# Patient Record
Sex: FEMALE
[De-identification: names, ages, dates, MRNs, and addresses within clinical notes are randomized per-mention and may not be internally consistent; named-entity substitution may affect disease eponyms.]

---

## 2017-11-27 ENCOUNTER — APPOINTMENT (OUTPATIENT)
Dept: ANTEPARTUM | Facility: CLINIC | Age: 27
End: 2017-11-27
Payer: COMMERCIAL

## 2017-11-27 ENCOUNTER — ASOB RESULT (OUTPATIENT)
Age: 27
End: 2017-11-27

## 2017-11-27 PROBLEM — Z00.00 ENCOUNTER FOR PREVENTIVE HEALTH EXAMINATION: Status: ACTIVE | Noted: 2017-11-27

## 2017-11-27 PROCEDURE — 76801 OB US < 14 WKS SINGLE FETUS: CPT

## 2018-01-29 ENCOUNTER — APPOINTMENT (OUTPATIENT)
Dept: ANTEPARTUM | Facility: CLINIC | Age: 28
End: 2018-01-29
Payer: COMMERCIAL

## 2018-01-29 ENCOUNTER — ASOB RESULT (OUTPATIENT)
Age: 28
End: 2018-01-29

## 2018-01-29 PROCEDURE — 76817 TRANSVAGINAL US OBSTETRIC: CPT

## 2018-01-29 PROCEDURE — 76811 OB US DETAILED SNGL FETUS: CPT

## 2018-03-19 ENCOUNTER — ASOB RESULT (OUTPATIENT)
Age: 28
End: 2018-03-19

## 2018-03-19 ENCOUNTER — APPOINTMENT (OUTPATIENT)
Dept: ANTEPARTUM | Facility: CLINIC | Age: 28
End: 2018-03-19
Payer: COMMERCIAL

## 2018-03-19 PROCEDURE — 76816 OB US FOLLOW-UP PER FETUS: CPT

## 2018-04-23 ENCOUNTER — APPOINTMENT (OUTPATIENT)
Dept: MATERNAL FETAL MEDICINE | Facility: CLINIC | Age: 28
End: 2018-04-23
Payer: COMMERCIAL

## 2018-04-23 ENCOUNTER — ASOB RESULT (OUTPATIENT)
Age: 28
End: 2018-04-23

## 2018-04-23 ENCOUNTER — OTHER (OUTPATIENT)
Age: 28
End: 2018-04-23

## 2018-04-23 VITALS — WEIGHT: 181 LBS | BODY MASS INDEX: 35.53 KG/M2 | HEIGHT: 60 IN

## 2018-04-23 DIAGNOSIS — Z78.9 OTHER SPECIFIED HEALTH STATUS: ICD-10-CM

## 2018-04-23 DIAGNOSIS — Z83.3 FAMILY HISTORY OF DIABETES MELLITUS: ICD-10-CM

## 2018-04-23 PROCEDURE — G0108 DIAB MANAGE TRN  PER INDIV: CPT

## 2018-05-01 ENCOUNTER — APPOINTMENT (OUTPATIENT)
Dept: ANTEPARTUM | Facility: CLINIC | Age: 28
End: 2018-05-01

## 2018-05-01 ENCOUNTER — ASOB RESULT (OUTPATIENT)
Age: 28
End: 2018-05-01

## 2018-05-01 ENCOUNTER — APPOINTMENT (OUTPATIENT)
Dept: MATERNAL FETAL MEDICINE | Facility: CLINIC | Age: 28
End: 2018-05-01
Payer: COMMERCIAL

## 2018-05-01 ENCOUNTER — APPOINTMENT (OUTPATIENT)
Dept: ANTEPARTUM | Facility: CLINIC | Age: 28
End: 2018-05-01
Payer: COMMERCIAL

## 2018-05-01 VITALS
DIASTOLIC BLOOD PRESSURE: 70 MMHG | SYSTOLIC BLOOD PRESSURE: 108 MMHG | BODY MASS INDEX: 34.96 KG/M2 | HEIGHT: 60 IN | WEIGHT: 178.1 LBS

## 2018-05-01 DIAGNOSIS — O99.810 ABNORMAL GLUCOSE COMPLICATING PREGNANCY: ICD-10-CM

## 2018-05-01 DIAGNOSIS — O24.410 GESTATIONAL DIABETES MELLITUS IN PREGNANCY, DIET CONTROLLED: ICD-10-CM

## 2018-05-01 PROCEDURE — 76816 OB US FOLLOW-UP PER FETUS: CPT

## 2018-05-01 PROCEDURE — 99213 OFFICE O/P EST LOW 20 MIN: CPT

## 2018-05-01 PROCEDURE — 76818 FETAL BIOPHYS PROFILE W/NST: CPT

## 2018-05-01 RX ORDER — VITAMIN C, CALCIUM, IRON, VITAMIN D3, VITAMIN E, VITAMIN B1, VITAMIN B2, VITAMIN B3, VITAMIN B6, FOLIC ACID, IODINE, ZINC, COPPER, DOCUSATE SODIUM, DOCOSAHEXAENOIC ACID (DHA) 27-1-50 MG
KIT ORAL
Refills: 0 | Status: ACTIVE | COMMUNITY

## 2018-05-14 ENCOUNTER — APPOINTMENT (OUTPATIENT)
Dept: MATERNAL FETAL MEDICINE | Facility: CLINIC | Age: 28
End: 2018-05-14
Payer: COMMERCIAL

## 2018-05-14 ENCOUNTER — ASOB RESULT (OUTPATIENT)
Age: 28
End: 2018-05-14

## 2018-05-14 VITALS — WEIGHT: 184.19 LBS | BODY MASS INDEX: 36.16 KG/M2 | HEIGHT: 60 IN

## 2018-05-14 PROCEDURE — G0108 DIAB MANAGE TRN  PER INDIV: CPT

## 2018-05-29 ENCOUNTER — APPOINTMENT (OUTPATIENT)
Dept: MATERNAL FETAL MEDICINE | Facility: CLINIC | Age: 28
End: 2018-05-29

## 2018-05-29 ENCOUNTER — APPOINTMENT (OUTPATIENT)
Dept: ANTEPARTUM | Facility: CLINIC | Age: 28
End: 2018-05-29
Payer: COMMERCIAL

## 2018-05-29 ENCOUNTER — ASOB RESULT (OUTPATIENT)
Age: 28
End: 2018-05-29

## 2018-05-29 PROCEDURE — 76816 OB US FOLLOW-UP PER FETUS: CPT

## 2018-06-05 ENCOUNTER — ASOB RESULT (OUTPATIENT)
Age: 28
End: 2018-06-05

## 2018-06-05 ENCOUNTER — APPOINTMENT (OUTPATIENT)
Dept: ANTEPARTUM | Facility: CLINIC | Age: 28
End: 2018-06-05
Payer: COMMERCIAL

## 2018-06-05 ENCOUNTER — APPOINTMENT (OUTPATIENT)
Dept: ANTEPARTUM | Facility: CLINIC | Age: 28
End: 2018-06-05

## 2018-06-05 PROCEDURE — 76818 FETAL BIOPHYS PROFILE W/NST: CPT

## 2018-06-05 PROCEDURE — 76815 OB US LIMITED FETUS(S): CPT

## 2018-06-11 ENCOUNTER — ASOB RESULT (OUTPATIENT)
Age: 28
End: 2018-06-11

## 2018-06-11 ENCOUNTER — APPOINTMENT (OUTPATIENT)
Dept: ANTEPARTUM | Facility: CLINIC | Age: 28
End: 2018-06-11
Payer: MEDICAID

## 2018-06-11 ENCOUNTER — APPOINTMENT (OUTPATIENT)
Dept: MATERNAL FETAL MEDICINE | Facility: CLINIC | Age: 28
End: 2018-06-11

## 2018-06-11 VITALS — WEIGHT: 188.44 LBS | HEIGHT: 60 IN | BODY MASS INDEX: 36.99 KG/M2

## 2018-06-11 PROCEDURE — 76818 FETAL BIOPHYS PROFILE W/NST: CPT

## 2020-09-30 ENCOUNTER — LABORATORY RESULT (OUTPATIENT)
Age: 30
End: 2020-09-30

## 2020-09-30 ENCOUNTER — APPOINTMENT (OUTPATIENT)
Dept: RHEUMATOLOGY | Facility: CLINIC | Age: 30
End: 2020-09-30
Payer: MEDICAID

## 2020-09-30 VITALS
HEIGHT: 60 IN | SYSTOLIC BLOOD PRESSURE: 126 MMHG | DIASTOLIC BLOOD PRESSURE: 84 MMHG | TEMPERATURE: 99.7 F | WEIGHT: 180 LBS | HEART RATE: 83 BPM | BODY MASS INDEX: 35.34 KG/M2 | OXYGEN SATURATION: 95 %

## 2020-09-30 DIAGNOSIS — R76.8 OTHER SPECIFIED ABNORMAL IMMUNOLOGICAL FINDINGS IN SERUM: ICD-10-CM

## 2020-09-30 PROCEDURE — 99204 OFFICE O/P NEW MOD 45 MIN: CPT

## 2020-09-30 RX ORDER — ASCORBIC ACID 500 MG
TABLET ORAL
Refills: 0 | Status: ACTIVE | COMMUNITY

## 2020-09-30 RX ORDER — BLOOD SUGAR DIAGNOSTIC
STRIP MISCELLANEOUS
Qty: 2 | Refills: 3 | Status: DISCONTINUED | COMMUNITY
Start: 2018-04-23 | End: 2020-09-30

## 2020-09-30 RX ORDER — URINE ACETONE TEST STRIPS
STRIP MISCELLANEOUS
Qty: 1 | Refills: 0 | Status: DISCONTINUED | COMMUNITY
Start: 2018-04-23 | End: 2020-09-30

## 2020-09-30 RX ORDER — LANCETS 28 GAUGE
EACH MISCELLANEOUS
Qty: 2 | Refills: 3 | Status: DISCONTINUED | COMMUNITY
Start: 2018-04-23 | End: 2020-09-30

## 2020-09-30 RX ORDER — CHROMIUM 200 MCG
TABLET ORAL
Refills: 0 | Status: ACTIVE | COMMUNITY

## 2020-09-30 RX ORDER — IRON/IRON ASP GLY/FA/MV-MIN 38 125-25-1MG
TABLET ORAL
Refills: 0 | Status: ACTIVE | COMMUNITY

## 2020-09-30 NOTE — PHYSICAL EXAM
[General Appearance - Well Nourished] : well nourished [General Appearance - Well Developed] : well developed [Sclera] : the sclera and conjunctiva were normal [Hearing Threshold Finger Rub Not Placer] : hearing was normal [Neck Cervical Mass (___cm)] : no neck mass was observed [Auscultation Breath Sounds / Voice Sounds] : lungs were clear to auscultation bilaterally [Heart Sounds] : normal S1 and S2 [Nail Clubbing] : no clubbing  or cyanosis of the fingernails [Musculoskeletal - Swelling] : no joint swelling seen [Motor Tone] : muscle strength and tone were normal [] : no rash [Skin Lesions] : no skin lesions [Affect] : the affect was normal [Mood] : the mood was normal [FreeTextEntry1] : mild alopecia, no malar

## 2020-09-30 NOTE — DATA REVIEWED
[FreeTextEntry1] : TSH normal \par CBC with Hg 9s and ferritin only 7 ( heavy menses) \par cmp with ALT 30 \par JOSE CRUZ 1:80 nuclear \par dsDNA 7 ( indeterminate) \par \par

## 2020-09-30 NOTE — HISTORY OF PRESENT ILLNESS
[FreeTextEntry1] : 28 yo woman with history of presents for eval of positive JOSE CRUZ \par \par Patient is followed by derm for alopecia and had intralesional kenalog and on DHEA.  upon w/u she is noted to have iron deficiency anemia, and Positive JOSE CRUZ 1:80 Nuclear and dsDNA 7 ( mildly positive) .  \par \par Patient complains of photosensitivity, and hand pain in the morning which improved with activity.  she also has pain of the feet and legs and which also improves as she becomes more active.  she is sometimes unable to squeeze mop due to hand pain.  patient denies any malar, oral lesions, eye issues, cough/sob, CP, serositis, low plts, low wbc, GI Issues.  Patient denies f/c/, wt loss, loss of appetite.  Patient denies Raynaud.  patient denies any DVTs/PEs.  Patient had 3 pregnancies, no miscarriages, all babies full term, no complications in pregnancy.  denies dry eye or dry mouth

## 2020-09-30 NOTE — ASSESSMENT
[FreeTextEntry1] : 28 yo woman with history of alopecia, photosensitivity, oral lesion, significant morning arthralgias and positive JOSE CRUZ 1:80 Nuclear and indetermine dsDNA. patient with most likely lupus \par \par --will check serologies and Urine studies \par --will check G6PD and pending results will start HCQ \par --patient is to see eye doctor

## 2020-09-30 NOTE — REVIEW OF SYSTEMS
[As Noted in HPI] : as noted in HPI [Fever] : no fever [Chills] : no chills [Eye Pain] : no eye pain [Red Eyes] : eyes not red [Chest Pain] : no chest pain [Cough] : no cough [SOB on Exertion] : no shortness of breath during exertion [Diarrhea] : no diarrhea [Swollen Glands] : no swollen glands [de-identified] : alopecia

## 2020-10-07 LAB
ALBUMIN SERPL ELPH-MCNC: 5.1 G/DL
ALP BLD-CCNC: 114 U/L
ALT SERPL-CCNC: 26 U/L
ANA SER IF-ACNC: NEGATIVE
ANION GAP SERPL CALC-SCNC: 15 MMOL/L
APPEARANCE: CLEAR
APTT IMM NP/PRE NP PPP: NORMAL
APTT INV RATIO PPP: 35.1 SEC
AST SERPL-CCNC: 19 U/L
B2 GLYCOPROT1 IGA SERPL IA-ACNC: <5 SAU
B2 GLYCOPROT1 IGG SER-ACNC: <5 SGU
B2 GLYCOPROT1 IGM SER-ACNC: <5 SMU
BASOPHILS # BLD AUTO: 0.05 K/UL
BASOPHILS NFR BLD AUTO: 0.7 %
BILIRUB SERPL-MCNC: 0.6 MG/DL
BILIRUBIN URINE: NEGATIVE
BLOOD URINE: NEGATIVE
BUN SERPL-MCNC: 10 MG/DL
C3 SERPL-MCNC: 150 MG/DL
C4 SERPL-MCNC: 21 MG/DL
CALCIUM SERPL-MCNC: 9.5 MG/DL
CARDIOLIPIN AB SER IA-ACNC: NEGATIVE
CCP AB SER IA-ACNC: <8 UNITS
CHLORIDE SERPL-SCNC: 101 MMOL/L
CK SERPL-CCNC: 146 U/L
CO2 SERPL-SCNC: 23 MMOL/L
COLOR: NORMAL
CONFIRM: 28.8 SEC
CREAT SERPL-MCNC: 0.52 MG/DL
CREAT SPEC-SCNC: 58 MG/DL
CREAT/PROT UR: NORMAL RATIO
CRP SERPL-MCNC: 0.41 MG/DL
DRVVT IMM 1:2 NP PPP: NORMAL
DRVVT SCREEN TO CONFIRM RATIO: 0.94 RATIO
DSDNA AB SER-ACNC: <12 IU/ML
ENA RNP AB SER IA-ACNC: 0.4 AL
ENA SM AB SER IA-ACNC: <0.2 AL
ENA SS-A AB SER IA-ACNC: 2.7 AL
ENA SS-B AB SER IA-ACNC: <0.2 AL
EOSINOPHIL # BLD AUTO: 0.2 K/UL
EOSINOPHIL NFR BLD AUTO: 3 %
ERYTHROCYTE [SEDIMENTATION RATE] IN BLOOD BY WESTERGREN METHOD: 15 MM/HR
G6PD SER-CCNC: 17.4 U/G HGB
GLUCOSE QUALITATIVE U: NEGATIVE
GLUCOSE SERPL-MCNC: 98 MG/DL
HCT VFR BLD CALC: 40.3 %
HGB BLD-MCNC: 12.8 G/DL
IMM GRANULOCYTES NFR BLD AUTO: 0.1 %
KETONES URINE: NEGATIVE
LEUKOCYTE ESTERASE URINE: NEGATIVE
LUPUS ANTICOAGULANT CASCADE REFLEX: NORMAL
LYMPHOCYTES # BLD AUTO: 2.19 K/UL
LYMPHOCYTES NFR BLD AUTO: 32.5 %
MAN DIFF?: NORMAL
MCHC RBC-ENTMCNC: 27.9 PG
MCHC RBC-ENTMCNC: 31.8 GM/DL
MCV RBC AUTO: 87.8 FL
MONOCYTES # BLD AUTO: 0.3 K/UL
MONOCYTES NFR BLD AUTO: 4.5 %
NEUTROPHILS # BLD AUTO: 3.99 K/UL
NEUTROPHILS NFR BLD AUTO: 59.2 %
NITRITE URINE: NEGATIVE
NPP NORMAL POOLED PLASMA: NORMAL SECS
PH URINE: 6
PLATELET # BLD AUTO: 313 K/UL
POTASSIUM SERPL-SCNC: 4 MMOL/L
PROT SERPL-MCNC: 7.5 G/DL
PROT UR-MCNC: <4 MG/DL
PROTEIN URINE: NEGATIVE
RBC # BLD: 4.59 M/UL
RBC # FLD: 17.6 %
RF+CCP IGG SER-IMP: NEGATIVE
RHEUMATOID FACT SER QL: <10 IU/ML
SCREEN DRVVT: 30 SEC
SODIUM SERPL-SCNC: 139 MMOL/L
SPECIFIC GRAVITY URINE: 1.01
TSH SERPL-ACNC: 0.75 UIU/ML
UROBILINOGEN URINE: NORMAL
WBC # FLD AUTO: 6.74 K/UL

## 2020-10-22 ENCOUNTER — APPOINTMENT (OUTPATIENT)
Dept: OPHTHALMOLOGY | Facility: CLINIC | Age: 30
End: 2020-10-22

## 2021-11-11 ENCOUNTER — APPOINTMENT (OUTPATIENT)
Dept: OPHTHALMOLOGY | Facility: CLINIC | Age: 31
End: 2021-11-11